# Patient Record
Sex: MALE
[De-identification: names, ages, dates, MRNs, and addresses within clinical notes are randomized per-mention and may not be internally consistent; named-entity substitution may affect disease eponyms.]

---

## 2020-10-14 ENCOUNTER — HOSPITAL ENCOUNTER (EMERGENCY)
Dept: HOSPITAL 52 - LL.ED | Age: 75
Discharge: HOME | End: 2020-10-14
Payer: MEDICARE
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
## 2020-10-14 DIAGNOSIS — S01.511A: Primary | ICD-10-CM

## 2020-10-14 DIAGNOSIS — Z79.82: ICD-10-CM

## 2020-10-14 DIAGNOSIS — Z79.899: ICD-10-CM

## 2020-10-14 NOTE — EDM.PDOC
ED HPI GENERAL MEDICAL PROBLEM





- General


Chief Complaint: Laceration


Stated Complaint: Laceration


Time Seen by Provider: 10/14/20 02:18


Source of Information: Reports: Patient


History Limitations: Reports: No Limitations





- History of Present Illness


INITIAL COMMENTS - FREE TEXT/NARRATIVE: 





Pt cut corner of top lip while shaving  Is on ASA and cant stop the bleeding


Onset: Today, Sudden


Location: Reports: Face





- Related Data


                                    Allergies











Allergy/AdvReac Type Severity Reaction Status Date / Time


 


No Known Allergies Allergy   Verified 10/09/20 13:54











Home Meds: 


                                    Home Meds





Aspirin [Aspirin EC] 81 mg PO DAILY 10/14/20 [History]


Cholecalciferol (Vitamin D3) [Vitamin D3] 2,000 unit PO DAILY 10/14/20 [History]


Finasteride [Proscar] 5 mg PO DAILY 10/14/20 [History]


Potassium Gluconate [Potassium] 600 mg PO DAILY 10/14/20 [History]


Simvastatin 10 mg PO BEDTIME 10/14/20 [History]











Social & Family History





- Tobacco Use


Tobacco Use Status *Q: Never Tobacco User


Second Hand Smoke Exposure: No





- Caffeine Use


Caffeine Use: Reports: Coffee





- Recreational Drug Use


Recreational Drug Use: No





ED ROS GENERAL





- Review of Systems


Review Of Systems: See Below


Skin: Reports: Other (Cut on upper lip)





ED EXAM, SKIN/RASH


Exam: See Below


Skin: Other (corner of upper lip with tangential superficial)





Course





- Vital Signs


Last Recorded V/S: 





                                Last Vital Signs











Temp  98.4 F   10/14/20 02:14


 


Pulse  78   10/14/20 02:14


 


Resp  14   10/14/20 02:14


 


BP  155/75 H  10/14/20 02:14


 


Pulse Ox  98   10/14/20 02:14














- Re-Assessments/Exams


Free Text/Narrative Re-Assessment/Exam: 





10/14/20 02:36


Surgi-michael placed and pressure held  Bleeding stopped





Departure





- Departure


Time of Disposition: 02:40


Disposition: Home, Self-Care 01


Clinical Impression: 


Lip laceration


Qualifiers:


 Encounter type: initial encounter Qualified Code(s): S01.511A - Laceration 

without foreign body of lip, initial encounter








- Discharge Information


*PRESCRIPTION DRUG MONITORING PROGRAM REVIEWED*: Not Applicable


*COPY OF PRESCRIPTION DRUG MONITORING REPORT IN PATIENT GERBER: Not Applicable


Instructions:  Laceration Care, Adult


Referrals: 


Boy Lopez PA [Primary Care Provider] - 


Additional Instructions: 


Keep wound clean


Follow up in clinic





Sepsis Event Note (ED)





- Evaluation


Sepsis Screening Result: No Definite Risk





- Focused Exam


Vital Signs: 





                                   Vital Signs











  Temp Pulse Resp BP Pulse Ox


 


 10/14/20 02:14  98.4 F  78  14  155/75 H  98

## 2020-11-19 ENCOUNTER — HOSPITAL ENCOUNTER (OUTPATIENT)
Dept: HOSPITAL 52 - LL.SDS | Age: 75
Discharge: HOME | End: 2020-11-19
Attending: SURGERY
Payer: MEDICARE

## 2020-11-19 DIAGNOSIS — K62.1: ICD-10-CM

## 2020-11-19 DIAGNOSIS — Z87.891: ICD-10-CM

## 2020-11-19 DIAGNOSIS — Z98.890: ICD-10-CM

## 2020-11-19 DIAGNOSIS — K57.30: ICD-10-CM

## 2020-11-19 DIAGNOSIS — Z79.899: ICD-10-CM

## 2020-11-19 DIAGNOSIS — Z20.828: ICD-10-CM

## 2020-11-19 DIAGNOSIS — Z01.812: ICD-10-CM

## 2020-11-19 DIAGNOSIS — K63.5: Primary | ICD-10-CM

## 2020-11-19 PROCEDURE — U0002 COVID-19 LAB TEST NON-CDC: HCPCS

## 2020-11-19 NOTE — OR
Date of Procedure:  11/19/2020

 

PREOPERATIVE DIAGNOSIS:  Change in bowel habits.

 

POSTOPERATIVE DIAGNOSES:  Colon polyps, sigmoid diverticulosis.

 

OPERATION PERFORMED:  Colonoscopy with polypectomy.

 

INDICATIONS FOR SURGERY:  This 75-year-old male has noticed a change in his

bowel pattern over the past several months with slightly increased frequency of

stools.  He is noticing this to be gradually returning to normal.  He also has a

history of colon polyps.

 

FINDINGS:  Two polyps were noted on today's exam, one is a rectal polyp 3 cm

from the anal verge and approximately 5 mm in size, the other is a sessile polyp

in hepatic flexure of approximately 1 cm in size and sessile in configuration.

The patient also has moderate to extensive diverticulosis in the sigmoid region,

although this does not appear to be acutely inflamed at this time.  The

remainder of the colon appears normal.

 

DESCRIPTION OF PROCEDURE:  The patient was taken to the operating room, given

intravenous sedation, and with him in the left lateral decubitus position,

digital rectal exam was performed showing no rectal masses.  The Olympus

colonoscope was inserted into the rectum and retroflexed examination of the

rectal canal was performed.  In the rectum, the above-described rectal polyp was

identified.  This was removed with a cautery snare and retrieved.  The scope was

then carefully advanced under direct visualization through the colon to the

hepatic flexure where the other above-described polyp was identified.  This was

sessile in configuration, which made it somewhat difficult to remove completely

with a snare and so a portion of the polyp was removed with a cautery snare and

this will be submitted to pathology for evaluation.  The remainder of the polyp

is destroyed with cautery using the snare, so this polyp did appear to be

completely removed/destroyed.  The scope was then advanced to the cecum where

the cecal acquisition was confirmed by noting the normal internal cecal anatomy

including the appendiceal orifice and the ileocecal valve.  The light is also

noted to transilluminate the abdominal wall in the right lower quadrant.  After

examining the cecum, the scope was slowly withdrawn sequentially re-examining

the colonic segments until the entire colon and rectum had been fully examined.

The scope was removed and the patient was taken from the operating room in

satisfactory condition.

 

ESTIMATED BLOOD LOSS:  Zero.

 

COMPLICATIONS:  None.

 

PROGNOSIS:  Good.

 

ALY Villalba MD

DD:  11/19/2020 12:04:51

DT:  11/19/2020 12:33:16

Job #:  [id number]

## 2020-11-19 NOTE — PCM.OPNOTE
- General Post-Op/Procedure Note


Date of Surgery/Procedure: 11/19/20


Operative Procedure(s): Colonoscopy with polypectomy


Findings: 





Small sessile polyps


Moderate Sigmoid Diverticulosis


Pre Op Diagnosis: Change in bowel habits


Post-Op Diagnosis: Colon Polyps.  Sigmoid Diverticulosis


Anesthesia Technique: MAC


Primary Surgeon: Adebayo Villalba


Pathology: 





Colon Polyps


EBL in mLs: 0


Complications: None


Condition: Good

## 2020-11-19 NOTE — PCM.PN
- General Info


Date of Service: 11/19/20





- Review of Systems


Systems Review Comment:: 





75-year-old male referred for colonoscopy.  He does have a history of colon 

polyps.  He is also noticed a change in his bowel pattern with slightly more 

loose stools recently although symptoms have now almost completely resolved with

bowel pattern close to normal for him.  He is medically stable to proceed today.

 His recent history and physical is reviewed and no significant changes are 

noted.  I have discussed the proposed colonoscopy with the patient.  He agrees 

to proceed excepting risks.





- Patient Data


Vitals - Most Recent: 


                                Last Vital Signs











Temp  98.1 F   11/19/20 10:33


 


Pulse  52 L  11/19/20 10:33


 


Resp  16   11/19/20 10:33


 


BP  131/90   11/19/20 10:33


 


Pulse Ox  95   11/19/20 10:33











Weight - Most Recent: 77.111 kg


Med Orders - Current: 


                               Current Medications





Lactated Ringer's (Ringers, Lactated)  1,000 mls @ 125 mls/hr IV ASDIRECTED [place]


   Last Admin: 11/19/20 10:29 Dose:  125 mls/hr


   Documented by: 


Sodium Chloride (Saline Flush)  10 ml FLUSH ASDIRECTED PRN


   PRN Reason: Keep Vein Open





Discontinued Medications





Midazolam HCl (Versed 1 Mg/Ml) Confirm Administered Dose 2 mg .ROUTE .STK-MED 

ONE


   Stop: 11/19/20 11:10


Propofol (Diprivan  20 Ml) Confirm Administered Dose 400 mg .ROUTE .STK-MED ONE


   Stop: 11/19/20 11:10











Sepsis Event Note





- Focused Exam


Vital Signs: 


                                   Vital Signs











  Temp Pulse Resp BP Pulse Ox


 


 11/19/20 10:33  98.1 F  52 L  16  131/90  95














- Problem List Review


Problem List Initiated/Reviewed/Updated: Yes





- My Orders


Last 24 Hours: 


My Active Orders





11/19/20 10:00


Patient Status [ADT] Routine 


Peripheral IV Care [RC] .AS DIRECTED 


Verify Patient Consent Obtain [RC] ASDIRECTED 


Lactated Ringers [Ringers, Lactated] 1,000 ml IV ASDIRECTED 


Sodium Chloride 0.9% [Saline Flush]   10 ml FLUSH ASDIRECTED PRN 


Peripheral IV Insertion Adult [OM.PC] Routine 














- Assessment


Assessment:: 





History of colon polyps


Change in bowel pattern





- Plan


Plan:: 





Colonoscopy